# Patient Record
Sex: MALE | Race: WHITE | Employment: FULL TIME | URBAN - METROPOLITAN AREA
[De-identification: names, ages, dates, MRNs, and addresses within clinical notes are randomized per-mention and may not be internally consistent; named-entity substitution may affect disease eponyms.]

---

## 2019-05-25 DIAGNOSIS — F51.01 PRIMARY INSOMNIA: Primary | ICD-10-CM

## 2019-06-03 RX ORDER — TEMAZEPAM 30 MG/1
CAPSULE ORAL
Qty: 30 CAPSULE | Refills: 1 | Status: SHIPPED | OUTPATIENT
Start: 2019-06-03 | End: 2019-07-03

## 2019-10-13 ENCOUNTER — HOSPITAL ENCOUNTER (EMERGENCY)
Age: 44
Discharge: HOME | End: 2019-10-13
Attending: EMERGENCY MEDICINE
Payer: COMMERCIAL

## 2019-10-13 ENCOUNTER — APPOINTMENT (OUTPATIENT)
Dept: GENERAL RADIOLOGY | Age: 44
End: 2019-10-13
Payer: COMMERCIAL

## 2019-10-13 VITALS
TEMPERATURE: 98.2 F | RESPIRATION RATE: 16 BRPM | OXYGEN SATURATION: 97 % | BODY MASS INDEX: 24.79 KG/M2 | DIASTOLIC BLOOD PRESSURE: 88 MMHG | WEIGHT: 210 LBS | SYSTOLIC BLOOD PRESSURE: 140 MMHG | HEIGHT: 77 IN | HEART RATE: 77 BPM

## 2019-10-13 DIAGNOSIS — S93.401A SPRAIN OF RIGHT ANKLE, UNSPECIFIED LIGAMENT, INITIAL ENCOUNTER: Primary | ICD-10-CM

## 2019-10-13 PROCEDURE — 99283 EMERGENCY DEPT VISIT LOW MDM: CPT

## 2019-10-13 PROCEDURE — 73610 X-RAY EXAM OF ANKLE: CPT

## 2019-10-13 ASSESSMENT — PAIN DESCRIPTION - LOCATION: LOCATION: ANKLE

## 2019-10-13 ASSESSMENT — PAIN DESCRIPTION - FREQUENCY: FREQUENCY: CONTINUOUS

## 2019-10-13 ASSESSMENT — PAIN DESCRIPTION - DESCRIPTORS: DESCRIPTORS: SORE

## 2019-10-13 ASSESSMENT — PAIN DESCRIPTION - PAIN TYPE: TYPE: ACUTE PAIN

## 2019-10-13 ASSESSMENT — PAIN DESCRIPTION - ORIENTATION: ORIENTATION: RIGHT

## 2019-10-13 ASSESSMENT — PAIN SCALES - GENERAL: PAINLEVEL_OUTOF10: 6

## 2022-03-29 ENCOUNTER — APPOINTMENT (OUTPATIENT)
Dept: CT IMAGING | Age: 47
End: 2022-03-29
Payer: COMMERCIAL

## 2022-03-29 ENCOUNTER — HOSPITAL ENCOUNTER (EMERGENCY)
Age: 47
Discharge: HOME | End: 2022-03-29
Attending: EMERGENCY MEDICINE
Payer: COMMERCIAL

## 2022-03-29 ENCOUNTER — APPOINTMENT (OUTPATIENT)
Dept: GENERAL RADIOLOGY | Age: 47
End: 2022-03-29
Payer: COMMERCIAL

## 2022-03-29 VITALS
DIASTOLIC BLOOD PRESSURE: 93 MMHG | SYSTOLIC BLOOD PRESSURE: 130 MMHG | BODY MASS INDEX: 25.98 KG/M2 | TEMPERATURE: 98.8 F | HEIGHT: 77 IN | OXYGEN SATURATION: 93 % | HEART RATE: 70 BPM | WEIGHT: 220 LBS | RESPIRATION RATE: 17 BRPM

## 2022-03-29 DIAGNOSIS — R42 VERTIGO: Primary | ICD-10-CM

## 2022-03-29 LAB
ABSOLUTE EOS #: 0.32 K/UL (ref 0–0.44)
ABSOLUTE IMMATURE GRANULOCYTE: <0.03 K/UL (ref 0–0.3)
ABSOLUTE LYMPH #: 1.63 K/UL (ref 1.1–3.7)
ABSOLUTE MONO #: 0.56 K/UL (ref 0.1–1.2)
ALBUMIN SERPL-MCNC: 4.5 G/DL (ref 3.5–5.2)
ALBUMIN/GLOBULIN RATIO: 1.8 (ref 1–2.5)
ALP BLD-CCNC: 73 U/L (ref 40–129)
ALT SERPL-CCNC: 45 U/L (ref 5–41)
ANION GAP SERPL CALCULATED.3IONS-SCNC: 9 MMOL/L (ref 9–17)
AST SERPL-CCNC: 20 U/L
BASOPHILS # BLD: 1 % (ref 0–2)
BASOPHILS ABSOLUTE: 0.06 K/UL (ref 0–0.2)
BILIRUB SERPL-MCNC: 0.41 MG/DL (ref 0.3–1.2)
BUN BLDV-MCNC: 13 MG/DL (ref 6–20)
CALCIUM SERPL-MCNC: 9.6 MG/DL (ref 8.6–10.4)
CHLORIDE BLD-SCNC: 102 MMOL/L (ref 98–107)
CO2: 26 MMOL/L (ref 20–31)
CREAT SERPL-MCNC: 0.96 MG/DL (ref 0.7–1.2)
EOSINOPHILS RELATIVE PERCENT: 4 % (ref 1–4)
GFR AFRICAN AMERICAN: >60 ML/MIN
GFR NON-AFRICAN AMERICAN: >60 ML/MIN
GFR SERPL CREATININE-BSD FRML MDRD: ABNORMAL ML/MIN/{1.73_M2}
GLUCOSE BLD-MCNC: 99 MG/DL (ref 70–99)
HCT VFR BLD CALC: 46.5 % (ref 40.7–50.3)
HEMOGLOBIN: 16.2 G/DL (ref 13–17)
IMMATURE GRANULOCYTES: 0 %
LIPASE: 17 U/L (ref 13–60)
LYMPHOCYTES # BLD: 21 % (ref 24–43)
MAGNESIUM: 2.2 MG/DL (ref 1.6–2.6)
MCH RBC QN AUTO: 29.8 PG (ref 25.2–33.5)
MCHC RBC AUTO-ENTMCNC: 34.8 G/DL (ref 28.4–34.8)
MCV RBC AUTO: 85.5 FL (ref 82.6–102.9)
MONOCYTES # BLD: 7 % (ref 3–12)
NRBC AUTOMATED: 0 PER 100 WBC
PDW BLD-RTO: 13.1 % (ref 11.8–14.4)
PLATELET # BLD: 212 K/UL (ref 138–453)
PMV BLD AUTO: 10.1 FL (ref 8.1–13.5)
POTASSIUM SERPL-SCNC: 3.8 MMOL/L (ref 3.7–5.3)
PRO-BNP: <20 PG/ML
RBC # BLD: 5.44 M/UL (ref 4.21–5.77)
SEG NEUTROPHILS: 67 % (ref 36–65)
SEGMENTED NEUTROPHILS ABSOLUTE COUNT: 5.08 K/UL (ref 1.5–8.1)
SODIUM BLD-SCNC: 137 MMOL/L (ref 135–144)
TOTAL PROTEIN: 7 G/DL (ref 6.4–8.3)
TROPONIN, HIGH SENSITIVITY: <6 NG/L (ref 0–22)
WBC # BLD: 7.7 K/UL (ref 3.5–11.3)

## 2022-03-29 PROCEDURE — 93005 ELECTROCARDIOGRAM TRACING: CPT | Performed by: STUDENT IN AN ORGANIZED HEALTH CARE EDUCATION/TRAINING PROGRAM

## 2022-03-29 PROCEDURE — 6370000000 HC RX 637 (ALT 250 FOR IP): Performed by: STUDENT IN AN ORGANIZED HEALTH CARE EDUCATION/TRAINING PROGRAM

## 2022-03-29 PROCEDURE — 83735 ASSAY OF MAGNESIUM: CPT

## 2022-03-29 PROCEDURE — 71045 X-RAY EXAM CHEST 1 VIEW: CPT

## 2022-03-29 PROCEDURE — 80053 COMPREHEN METABOLIC PANEL: CPT

## 2022-03-29 PROCEDURE — 85025 COMPLETE CBC W/AUTO DIFF WBC: CPT

## 2022-03-29 PROCEDURE — 99285 EMERGENCY DEPT VISIT HI MDM: CPT

## 2022-03-29 PROCEDURE — 83880 ASSAY OF NATRIURETIC PEPTIDE: CPT

## 2022-03-29 PROCEDURE — 83690 ASSAY OF LIPASE: CPT

## 2022-03-29 PROCEDURE — 84484 ASSAY OF TROPONIN QUANT: CPT

## 2022-03-29 PROCEDURE — 70450 CT HEAD/BRAIN W/O DYE: CPT

## 2022-03-29 RX ORDER — MECLIZINE HCL 12.5 MG/1
12.5 TABLET ORAL ONCE
Status: COMPLETED | OUTPATIENT
Start: 2022-03-29 | End: 2022-03-29

## 2022-03-29 RX ORDER — ACETAMINOPHEN 500 MG
1000 TABLET ORAL ONCE
Status: COMPLETED | OUTPATIENT
Start: 2022-03-29 | End: 2022-03-29

## 2022-03-29 RX ADMIN — MECLIZINE HCL 12.5 MG 12.5 MG: 12.5 TABLET ORAL at 20:24

## 2022-03-29 RX ADMIN — ACETAMINOPHEN 1000 MG: 500 TABLET ORAL at 20:24

## 2022-03-29 ASSESSMENT — PAIN DESCRIPTION - LOCATION: LOCATION: HEAD

## 2022-03-29 ASSESSMENT — PAIN SCALES - GENERAL
PAINLEVEL_OUTOF10: 5
PAINLEVEL_OUTOF10: 0
PAINLEVEL_OUTOF10: 2
PAINLEVEL_OUTOF10: 3

## 2022-03-29 ASSESSMENT — PAIN - FUNCTIONAL ASSESSMENT: PAIN_FUNCTIONAL_ASSESSMENT: 0-10

## 2022-03-29 NOTE — ED NOTES
Pt came to ER with wife due to headache and dizziness during the morning. Upon going here as stated pt felt light headedness. Pt had high BP last night as stated. No medication has been taken. Pt felt like his drunk even his not drinking alcohol. BP noted at 554 systolic as stated by wife. Pt had exp acid reflux lately. Pt has a hx of cardiac problem as stated. Breathing non labored. VS taken and recorded. Seen and examined by Dr. Deniz Ho. Pt on semi johansen's position, put pt on full cardiac monitor.      Nena Jimenez RN  03/29/22 8586

## 2022-03-30 LAB
EKG ATRIAL RATE: 71 BPM
EKG P AXIS: 29 DEGREES
EKG P-R INTERVAL: 166 MS
EKG Q-T INTERVAL: 388 MS
EKG QRS DURATION: 110 MS
EKG QTC CALCULATION (BAZETT): 421 MS
EKG R AXIS: -6 DEGREES
EKG T AXIS: 28 DEGREES
EKG VENTRICULAR RATE: 71 BPM

## 2022-03-30 PROCEDURE — 93010 ELECTROCARDIOGRAM REPORT: CPT | Performed by: INTERNAL MEDICINE

## 2022-03-30 ASSESSMENT — ENCOUNTER SYMPTOMS
SHORTNESS OF BREATH: 0
STRIDOR: 0
PHOTOPHOBIA: 0

## 2022-03-30 NOTE — FLOWSHEET NOTE
SPIRITUAL CARE DEPARTMENT - [facility]  PROGRESS NOTE    Shift date: 3.29.2022  Shift day: Tuesday   Shift # 2    Room # 18/18   Name: Vincent Zee            Age: 40 y.o. Gender: male          Evangelical: unknown   Place of Nondenominational: unknown    Referral: Routine Visit    Admit Date & Time: 3/29/2022  7:37 PM    PATIENT/EVENT DESCRIPTION:  Vincent Zee is a 40 y.o. male who has significant other bedside requesting assistance for the patient    SPIRITUAL ASSESSMENT/INTERVENTION:  Patient and significant other appear to be calm and coping. Patient states that he wants to use the bathroom and asked if he could get up. Significant other explains that he has vertigo.  assisted by providing a bedside urinal for the patient. Patient seemed slightly apprehensive but kept the device. No other immediate spiritual or emotional needs present at this time. Determined support to be available. SPIRITUAL CARE FOLLOW-UP PLAN:  Chaplains will remain available to offer spiritual and emotional support as needed. Electronically signed by Radha Sharma on 3/29/2022 at [address] [phone]       03/29/22 2110   Encounter Summary   Services provided to: Patient and family together   Referral/Consult From: Family   Support System Family members   Continue Visiting   ([phone])   Complexity of Encounter Moderate   Length of Encounter 15 minutes   Spiritual Assessment Completed Yes   Routine   Type Initial   Assessment Calm; Approachable;Coping   Intervention Active listening;Explored feelings, thoughts, concerns; Discussed illness/injury and it's impact   Outcome Expressed gratitude;Expressed feelings/needs/concerns;Engaged in conversation     Electronically signed by Citlali Gill on 3/29/2022 at 9:11 PM

## 2022-03-30 NOTE — ED PROVIDER NOTES
[facility]     Emergency Department     Faculty Attestation    I performed a history and physical examination of the patient and discussed management with the resident. I reviewed the residents note and agree with the documented findings and plan of care. Any areas of disagreement are noted on the chart. I was personally present for the key portions of any procedures. I have documented in the chart those procedures where I was not present during the key portions. I have reviewed the emergency nurses triage note. I agree with the chief complaint, past medical history, past surgical history, allergies, medications, social and family history as documented unless otherwise noted below. For Physician Assistant/ Nurse Practitioner cases/documentation I have personally evaluated this patient and have completed at least one if not all key elements of the E/M (history, physical exam, and MDM). Additional findings are as noted. I have personally seen and evaluated the patient. I find the patient's history and physical exam are consistent with the NP/PA documentation. I agree with the care provided, treatment rendered, disposition and follow-up plan. Patient describes an episode yesterday that included headache and dizziness. The dizziness was described both as lightheadedness and certainly a spinning sensation which is felt to have been improved. The patient denies alcohol but felt that he was drunk and still feels out of sorts at the present time. Also concerned of elevated blood pressures that have been occurring lately as well as insomnia. Physical exam I find no neurologic deficits he does indeed have horizontal nystagmus on examination but no motor or sensory deficits are detected. The patient is still considered low to moderate risk for stroke or cardiovascular risk at this time.       EKG Interpretation    Interpreted by emergency department physician    Rhythm: normal

## 2022-03-30 NOTE — ED PROVIDER NOTES
[facility]  ED  Emergency Department Encounter  Emergency Medicine Resident     Pt Name: Sally Padilla  MRN: [id number]  Sidgfkomal 1975  Date of evaluation: 3/29/22  PCP:  Hussein Arias MD    [address] [place] [address]       Chief Complaint   Patient presents with    Dizziness     on and off x2 days    Hypertension     reports bp being high at home last 133/96     Headache       HISTORY OFPRESENT ILLNESS  (Location/Symptom, Timing/Onset, Context/Setting, Quality, Duration, Modifying Factors,Severity.)      Sally Padilla is a 40 y. o.yo male who presents with 2-day history of dizziness, feeling as though the room is spinning. Patient reports yesterday, when he took his blood pressure, it was 162 systolic, his wife in the room reports that his pressures usually 513 systolic. Patient states that his symptoms are progressively worsened which is why he is here. He denies any shortness of breath however complains of intermittent epigastric pain that radiates to his left sternal chest.  He denies any nausea, vomit, abdominal pain. She reports that he is not on any antihypertensive medication, he does not smoke daily, he drinks alcohol occasionally but he does have an extensive family history of cardiac diseases. PAST MEDICAL / SURGICAL / SOCIAL / FAMILY HISTORY      has a past medical history of RLS (restless legs syndrome). has no past surgical history on file.      Social History     Socioeconomic History    Marital status:      Spouse name: Not on file    Number of children: Not on file    Years of education: Not on file    Highest education level: Not on file   Occupational History    Not on file   Tobacco Use    Smoking status: Never Smoker    Smokeless tobacco: Never Used   Substance and Sexual Activity    Alcohol use: No    Drug use: No    Sexual activity: Not on file   Other Topics Concern    Not on file   Social History Narrative    Not on file     Social Determinants of Health     Financial Resource Strain: Low Risk     Difficulty of Paying Living Expenses: Not hard at all   Food Insecurity: No Food Insecurity    Worried About Running Out of Food in the Last Year: Never true    Alfredo of Food in the Last Year: Never true   Transportation Needs: No Transportation Needs    Lack of Transportation (Medical): No    Lack of Transportation (Non-Medical): No   Physical Activity:     Days of Exercise per Week: Not on file    Minutes of Exercise per Session: Not on file   Stress:     Feeling of Stress : Not on file   Social Connections:     Frequency of Communication with Friends and Family: Not on file    Frequency of Social Gatherings with Friends and Family: Not on file    Attends Orthodox Services: Not on file    Active Member of Clubs or Organizations: Not on file    Attends Club or Organization Meetings: Not on file    Marital Status: Not on file   Intimate Partner Violence:     Fear of Current or Ex-Partner: Not on file    Emotionally Abused: Not on file    Physically Abused: Not on file    Sexually Abused: Not on file   Housing Stability:     Unable to Pay for Housing in the Last Year: Not on file    Number of [place] in the Last Year: Not on file    Unstable Housing in the Last Year: Not on file       No family history on file. Allergies:  Codeine    Home Medications:  Prior to Admission medications    Medication Sig Start Date End Date Taking? Authorizing Provider   rOPINIRole (REQUIP) 1 MG tablet TAKE 1 TABLET BY MOUTH EVERY DAY AT NIGHT 1/7/22   Josue St MD   gabapentin (NEURONTIN) 300 MG capsule TAKE 1 CAPSULE BY MOUTH 2 TIMES DAILY FOR 90 DAYS.  11/30/21 2/28/22  Josue St MD   albuterol sulfate HFA (VENTOLIN HFA) 108 (90 Base) MCG/ACT inhaler Inhale 2 puffs into the lungs 4 times daily as needed for Wheezing 10/1/21 10/31/21  Jose Jc MD   hydrOXYzine (ATARAX) 25 MG tablet TAKE 1 TABLET BY MOUTH EVERY DAY AT NIGHT  Patient not taking: Reported on 10/1/2021 5/19/21   Juve DuffyelianCHRISTIANO   temazepam (RESTORIL) 30 MG capsule TAKE 1 CAPSULE BY MOUTH EVERY DAY AT BEDTIME AS NEEDED  Patient not taking: Reported on 10/1/2021 11/25/19   Historical Provider, MD       REVIEW OFSYSTEMS    (2-9 systems for level 4, 10 or more for level 5)      Review of Systems   Constitutional: Negative for fatigue and fever. Eyes: Negative for photophobia and visual disturbance. Respiratory: Negative for shortness of breath and stridor. Cardiovascular: Positive for chest pain (intermittent chest pain). Musculoskeletal: Negative for neck pain and neck stiffness. Skin: Negative for wound. Neurological: Positive for dizziness. Psychiatric/Behavioral: Negative for behavioral problems and confusion. PHYSICAL EXAM   (up to 7 for level 4, 8 or more forlevel 5)      INITIAL VITALS:   ED Triage Vitals   BP Temp Temp Source Pulse Resp SpO2 Height Weight   03/29/22 1935 03/29/22 1949 03/29/22 1949 03/29/22 1935 03/29/22 1935 03/29/22 1935 03/29/22 1937 03/29/22 1937   129/85 98.8 °F (37.1 °C) Oral 80 18 95 % 6' 5\" (1.956 m) 220 lb (99.8 kg)       Physical Exam  Constitutional:       Appearance: Normal appearance. HENT:      Head: Normocephalic and atraumatic. Mouth/Throat:      Mouth: Mucous membranes are moist.   Eyes:      Extraocular Movements: Extraocular movements intact. Pupils: Pupils are equal, round, and reactive to light. Cardiovascular:      Rate and Rhythm: Normal rate. Pulmonary:      Effort: Pulmonary effort is normal. No respiratory distress. Abdominal:      General: There is no distension. Tenderness: There is no abdominal tenderness. Musculoskeletal:         General: No swelling or tenderness. Normal range of motion. Cervical back: No rigidity or tenderness. Skin:     General: Skin is warm. Capillary Refill: Capillary refill takes less than 2 seconds. Coloration: Skin is not jaundiced. Neurological:      General: No focal deficit present. Mental Status: He is alert and oriented to person, place, and time. Motor: No weakness. Gait: Gait normal.      Comments: Horizontal nystagmus appreciated on physical exam, normal finger-to-nose testing, normal gait         DIFFERENTIAL  DIAGNOSIS     PLAN (LABS / IMAGING / EKG):  Orders Placed This Encounter   Procedures    XR CHEST PORTABLE    CT Head WO Contrast    CBC with Auto Differential    Troponin    Brain Natriuretic Peptide    Lipase    Comprehensive Metabolic Panel    Magnesium    EKG 12 Lead       MEDICATIONS ORDERED:  Orders Placed This Encounter   Medications    acetaminophen (TYLENOL) tablet 1,000 mg    meclizine (ANTIVERT) tablet 12.5 mg       Initial MDM/Plan: 40 y.o. male who presents with vertigo, dizziness, intermittent chest pain. Patient does have horizontal nystagmus  Given that vertigo symptoms, dizzy, lightheaded, will get a CT head to rule out any intracranial pathology  We will also get cardiac work-up including EKG to rule out arrhythmias, electrolytes rule out any abnormalities, CBC to rule out anemia  If labs and images are unremarkable, plan for discharge.   Patient does have an appointment already set up with his PCP tomorrow    DIAGNOSTIC RESULTS / EMERGENCYDEPARTMENT COURSE / MDM     LABS:  Labs Reviewed   CBC WITH AUTO DIFFERENTIAL - Abnormal; Notable for the following components:       Result Value    Seg Neutrophils 67 (*)     Lymphocytes 21 (*)     All other components within normal limits   COMPREHENSIVE METABOLIC PANEL - Abnormal; Notable for the following components:    ALT 45 (*)     All other components within normal limits   TROPONIN   BRAIN NATRIURETIC PEPTIDE   LIPASE   MAGNESIUM         RADIOLOGY:  CT Head WO Contrast    Result Date: 3/29/2022  EXAMINATION: CT OF THE HEAD WITHOUT CONTRAST  3/29/2022 10:18 pm TECHNIQUE: CT of the head was performed without the administration of intravenous contrast. Dose modulation, iterative reconstruction, and/or weight based adjustment of the mA/kV was utilized to reduce the radiation dose to as low as reasonably achievable. COMPARISON: 651 HISTORY: ORDERING SYSTEM PROVIDED HISTORY: light headness TECHNOLOGIST PROVIDED HISTORY: light headness Decision Support Exception - unselect if not a suspected or confirmed emergency medical condition->Emergency Medical Condition (MA) FINDINGS: BRAIN/VENTRICLES: There is no acute intracranial hemorrhage, mass effect or midline shift. No abnormal extra-axial fluid collection. The gray-white differentiation is maintained without evidence of an acute infarct. There is no evidence of hydrocephalus. ORBITS: The visualized portion of the orbits demonstrate no acute abnormality. SINUSES: There are mucous retention cysts in the bilateral maxillary sinuses. Mastoid air cells are clear. SOFT TISSUES/SKULL:  No acute abnormality of the visualized skull or soft tissues. No acute intracranial abnormality. XR CHEST PORTABLE    Result Date: 3/29/2022  EXAMINATION: ONE XRAY VIEW OF THE CHEST 3/29/2022 8:11 pm COMPARISON: None. HISTORY: ORDERING SYSTEM PROVIDED HISTORY: chest pain TECHNOLOGIST PROVIDED HISTORY: chest pain FINDINGS: Overlying ECG monitor leads. The lungs are without acute focal process. There may be slight atelectasis left lateral base. There is no effusion or pneumothorax. The cardiomediastinal silhouette is without acute process. The osseous structures are without acute process. No acute process.          EKG  EKG Interpretation    Interpreted by me    Rhythm: normal sinus   Rate: normal  Axis: normal  Ectopy: none  Conduction: normal  ST Segments: no acute change  T Waves: no acute change  Q Waves: none    Clinical Impression: no acute changes and normal EKG    All EKG's are interpreted by the Emergency Department Physicianwho either signs or Co-signs this chart in the absence of a cardiologist.    EMERGENCY DEPARTMENT COURSE:  ED Course as of 03/30/22 0022   Wed Mar 30, 2022   0021 Patient CT head was negative. Electrolytes are within normal limits. Cardiac work-up normal.  I did assess him after he received Tylenol and Antivert, patient reports that his symptoms were improved with Antivert. Plan for discharge with close ECP follow-up tomorrow. Patient given strict return precautions return to emergency room if he has worsening symptoms. Patient agrees . [AN]      ED Course User Index  [AN] Sofía Calloway MD          PROCEDURES:  None    CONSULTS:  None    CRITICAL CARE:      FINAL IMPRESSION      1.  Vertigo          DISPOSITION / PLAN     DISPOSITION Decision To Discharge 03/29/2022 10:33:16 PM      PATIENT REFERRED TO:  [facility]  [address] [facility] [address]  [phone]    If symptoms worsen    Jill Phelps MD  [address] [phone]      As needed      DISCHARGE MEDICATIONS:  Discharge Medication List as of 3/29/2022 10:37 PM          Sofía Calloway MD  Emergency Medicine Resident    (Please note that portions of this note were completed with a voice recognition program.Efforts were made to edit the dictations but occasionally words are mis-transcribed.)        Sofía Calloway MD  Resident  03/30/22 0065

## 2023-04-24 ENCOUNTER — HOSPITAL ENCOUNTER (OUTPATIENT)
Dept: NUCLEAR MEDICINE | Age: 48
Discharge: HOME | End: 2023-04-26
Payer: COMMERCIAL

## 2023-04-24 DIAGNOSIS — R10.11 RIGHT UPPER QUADRANT ABDOMINAL PAIN: ICD-10-CM

## 2023-04-24 PROCEDURE — 3430000000 HC RX DIAGNOSTIC RADIOPHARMACEUTICAL: Performed by: FAMILY MEDICINE

## 2023-04-24 PROCEDURE — A9537 TC99M MEBROFENIN: HCPCS | Performed by: FAMILY MEDICINE

## 2023-04-24 PROCEDURE — 78227 HEPATOBIL SYST IMAGE W/DRUG: CPT

## 2023-04-24 RX ADMIN — Medication 6 MILLICURIE: at 08:18

## 2023-05-09 ENCOUNTER — HOSPITAL ENCOUNTER (OUTPATIENT)
Dept: VASCULAR LAB | Age: 48
Discharge: HOME | End: 2023-05-09
Payer: COMMERCIAL

## 2023-05-09 DIAGNOSIS — R09.89 ABNORMAL CAROTID PULSE: ICD-10-CM

## 2023-05-09 DIAGNOSIS — R42 DIZZINESS: ICD-10-CM

## 2023-05-09 PROCEDURE — 93880 EXTRACRANIAL BILAT STUDY: CPT

## 2024-03-01 PROBLEM — E66.3 OVERWEIGHT WITH BODY MASS INDEX (BMI) OF 28 TO 28.9 IN ADULT: Status: ACTIVE | Noted: 2024-03-01

## 2024-03-01 PROBLEM — I10 HTN (HYPERTENSION), BENIGN: Status: ACTIVE | Noted: 2024-03-01

## 2024-05-21 PROBLEM — J35.3 ENLARGED TONSILS AND ADENOIDS: Status: ACTIVE | Noted: 2024-05-21

## 2024-05-21 PROBLEM — J30.2 SEASONAL ALLERGIC REACTION: Status: ACTIVE | Noted: 2024-05-21

## 2024-08-27 PROBLEM — R11.0 NAUSEA: Status: ACTIVE | Noted: 2024-01-12

## 2024-08-27 PROBLEM — R10.11 RIGHT UPPER QUADRANT PAIN: Status: ACTIVE | Noted: 2024-01-12

## 2024-09-06 DIAGNOSIS — I10 HTN (HYPERTENSION), BENIGN: ICD-10-CM

## 2024-09-06 RX ORDER — LISINOPRIL 5 MG/1
TABLET ORAL
Qty: 135 TABLET | Refills: 0 | Status: CANCELLED | OUTPATIENT
Start: 2024-09-06

## 2025-07-08 ENCOUNTER — HOSPITAL ENCOUNTER
Dept: HOSPITAL 101 - SLEEP | Age: 50
Discharge: HOME | End: 2025-07-08
Payer: COMMERCIAL

## 2025-07-08 DIAGNOSIS — G25.81: ICD-10-CM

## 2025-07-08 DIAGNOSIS — G47.33: Primary | ICD-10-CM

## 2025-07-08 DIAGNOSIS — F51.01: ICD-10-CM

## 2025-07-08 PROCEDURE — 95810 POLYSOM 6/> YRS 4/> PARAM: CPT

## 2025-07-28 ENCOUNTER — APPOINTMENT (OUTPATIENT)
Dept: CT IMAGING | Age: 50
DRG: 390 | End: 2025-07-28
Payer: COMMERCIAL

## 2025-07-28 ENCOUNTER — APPOINTMENT (OUTPATIENT)
Dept: GENERAL RADIOLOGY | Age: 50
DRG: 390 | End: 2025-07-28
Payer: COMMERCIAL

## 2025-07-28 ENCOUNTER — HOSPITAL ENCOUNTER (INPATIENT)
Age: 50
LOS: 2 days | Discharge: HOME | DRG: 390 | End: 2025-07-30
Attending: STUDENT IN AN ORGANIZED HEALTH CARE EDUCATION/TRAINING PROGRAM | Admitting: STUDENT IN AN ORGANIZED HEALTH CARE EDUCATION/TRAINING PROGRAM
Payer: COMMERCIAL

## 2025-07-28 DIAGNOSIS — K56.609 SMALL BOWEL OBSTRUCTION (HCC): Primary | ICD-10-CM

## 2025-07-28 LAB
ALBUMIN SERPL-MCNC: 5 G/DL (ref 3.5–5.2)
ALBUMIN/GLOB SERPL: 1.4 {RATIO} (ref 1–2.5)
ALP SERPL-CCNC: 95 U/L (ref 40–129)
ALT SERPL-CCNC: 48 U/L (ref 10–50)
ANION GAP SERPL CALCULATED.3IONS-SCNC: 13 MMOL/L (ref 9–16)
AST SERPL-CCNC: 25 U/L (ref 10–50)
BASOPHILS # BLD: 0 K/UL (ref 0–0.2)
BASOPHILS # BLD: 0 K/UL (ref 0–0.2)
BASOPHILS NFR BLD: 0 % (ref 0–2)
BASOPHILS NFR BLD: 0 % (ref 0–2)
BILIRUB SERPL-MCNC: 0.9 MG/DL (ref 0–1.2)
BUN SERPL-MCNC: 20 MG/DL (ref 6–20)
CALCIUM SERPL-MCNC: 10.4 MG/DL (ref 8.6–10.4)
CHLORIDE SERPL-SCNC: 101 MMOL/L (ref 98–107)
CO2 SERPL-SCNC: 22 MMOL/L (ref 20–31)
CREAT SERPL-MCNC: 1.1 MG/DL (ref 0.7–1.2)
EKG ATRIAL RATE: 88 BPM
EKG P AXIS: 44 DEGREES
EKG P-R INTERVAL: 156 MS
EKG Q-T INTERVAL: 354 MS
EKG QRS DURATION: 98 MS
EKG QTC CALCULATION (BAZETT): 428 MS
EKG R AXIS: -16 DEGREES
EKG T AXIS: 41 DEGREES
EKG VENTRICULAR RATE: 88 BPM
EOSINOPHIL # BLD: 0.1 K/UL (ref 0–0.4)
EOSINOPHIL # BLD: 0.1 K/UL (ref 0–0.4)
EOSINOPHILS RELATIVE PERCENT: 1 % (ref 1–4)
EOSINOPHILS RELATIVE PERCENT: 2 % (ref 1–4)
ERYTHROCYTE [DISTWIDTH] IN BLOOD BY AUTOMATED COUNT: 14.4 % (ref 12.5–15.4)
ERYTHROCYTE [DISTWIDTH] IN BLOOD BY AUTOMATED COUNT: 14.6 % (ref 12.5–15.4)
GFR, ESTIMATED: 83 ML/MIN/1.73M2
GLUCOSE SERPL-MCNC: 108 MG/DL (ref 74–99)
HCT VFR BLD AUTO: 48.3 % (ref 41–53)
HCT VFR BLD AUTO: 55.7 % (ref 41–53)
HGB BLD-MCNC: 16.4 G/DL (ref 13.5–17.5)
HGB BLD-MCNC: 18.9 G/DL (ref 13.5–17.5)
LACTATE BLDV-SCNC: 1.6 MMOL/L (ref 0.5–2.2)
LYMPHOCYTES NFR BLD: 0.7 K/UL (ref 1–4.8)
LYMPHOCYTES NFR BLD: 1.4 K/UL (ref 1–4.8)
LYMPHOCYTES RELATIVE PERCENT: 18 % (ref 24–44)
LYMPHOCYTES RELATIVE PERCENT: 6 % (ref 24–44)
MAGNESIUM SERPL-MCNC: 2 MG/DL (ref 1.6–2.6)
MCH RBC QN AUTO: 29.3 PG (ref 26–34)
MCH RBC QN AUTO: 29.5 PG (ref 26–34)
MCHC RBC AUTO-ENTMCNC: 33.9 G/DL (ref 31–37)
MCHC RBC AUTO-ENTMCNC: 33.9 G/DL (ref 31–37)
MCV RBC AUTO: 86.4 FL (ref 80–100)
MCV RBC AUTO: 87.1 FL (ref 80–100)
MONOCYTES NFR BLD: 0.6 K/UL (ref 0.1–1.2)
MONOCYTES NFR BLD: 0.7 K/UL (ref 0.1–1.2)
MONOCYTES NFR BLD: 6 % (ref 2–11)
MONOCYTES NFR BLD: 8 % (ref 2–11)
NEUTROPHILS NFR BLD: 72 % (ref 36–66)
NEUTROPHILS NFR BLD: 87 % (ref 36–66)
NEUTS SEG NFR BLD: 10.6 K/UL (ref 1.8–7.7)
NEUTS SEG NFR BLD: 5.7 K/UL (ref 1.8–7.7)
PLATELET # BLD AUTO: 203 K/UL (ref 140–450)
PLATELET # BLD AUTO: 231 K/UL (ref 140–450)
PMV BLD AUTO: 8.3 FL (ref 6–12)
PMV BLD AUTO: 8.3 FL (ref 6–12)
POTASSIUM SERPL-SCNC: 4.5 MMOL/L (ref 3.7–5.3)
PROT SERPL-MCNC: 8.5 G/DL (ref 6.6–8.7)
RBC # BLD AUTO: 5.55 M/UL (ref 4.5–5.9)
RBC # BLD AUTO: 6.44 M/UL (ref 4.5–5.9)
SODIUM SERPL-SCNC: 136 MMOL/L (ref 136–145)
TROPONIN I SERPL HS-MCNC: <6 NG/L (ref 0–22)
WBC OTHER # BLD: 12.1 K/UL (ref 3.5–11)
WBC OTHER # BLD: 7.8 K/UL (ref 3.5–11)

## 2025-07-28 PROCEDURE — 2580000003 HC RX 258: Performed by: STUDENT IN AN ORGANIZED HEALTH CARE EDUCATION/TRAINING PROGRAM

## 2025-07-28 PROCEDURE — 74018 RADEX ABDOMEN 1 VIEW: CPT

## 2025-07-28 PROCEDURE — 6360000004 HC RX CONTRAST MEDICATION

## 2025-07-28 PROCEDURE — 74177 CT ABD & PELVIS W/CONTRAST: CPT

## 2025-07-28 PROCEDURE — 96374 THER/PROPH/DIAG INJ IV PUSH: CPT

## 2025-07-28 PROCEDURE — 2580000003 HC RX 258

## 2025-07-28 PROCEDURE — 84484 ASSAY OF TROPONIN QUANT: CPT

## 2025-07-28 PROCEDURE — 99222 1ST HOSP IP/OBS MODERATE 55: CPT | Performed by: STUDENT IN AN ORGANIZED HEALTH CARE EDUCATION/TRAINING PROGRAM

## 2025-07-28 PROCEDURE — 6360000002 HC RX W HCPCS

## 2025-07-28 PROCEDURE — 36415 COLL VENOUS BLD VENIPUNCTURE: CPT

## 2025-07-28 PROCEDURE — 96376 TX/PRO/DX INJ SAME DRUG ADON: CPT

## 2025-07-28 PROCEDURE — 83605 ASSAY OF LACTIC ACID: CPT

## 2025-07-28 PROCEDURE — 2500000003 HC RX 250 WO HCPCS

## 2025-07-28 PROCEDURE — 1200000000 HC SEMI PRIVATE

## 2025-07-28 PROCEDURE — 93010 ELECTROCARDIOGRAM REPORT: CPT | Performed by: INTERNAL MEDICINE

## 2025-07-28 PROCEDURE — 83735 ASSAY OF MAGNESIUM: CPT

## 2025-07-28 PROCEDURE — 80053 COMPREHEN METABOLIC PANEL: CPT

## 2025-07-28 PROCEDURE — 99285 EMERGENCY DEPT VISIT HI MDM: CPT

## 2025-07-28 PROCEDURE — 85025 COMPLETE CBC W/AUTO DIFF WBC: CPT

## 2025-07-28 PROCEDURE — 6360000002 HC RX W HCPCS: Performed by: STUDENT IN AN ORGANIZED HEALTH CARE EDUCATION/TRAINING PROGRAM

## 2025-07-28 PROCEDURE — 93005 ELECTROCARDIOGRAM TRACING: CPT

## 2025-07-28 PROCEDURE — 96375 TX/PRO/DX INJ NEW DRUG ADDON: CPT

## 2025-07-28 RX ORDER — MAGNESIUM SULFATE IN WATER 40 MG/ML
2000 INJECTION, SOLUTION INTRAVENOUS PRN
Status: DISCONTINUED | OUTPATIENT
Start: 2025-07-28 | End: 2025-07-30 | Stop reason: HOSPADM

## 2025-07-28 RX ORDER — LORAZEPAM 2 MG/ML
1 INJECTION INTRAMUSCULAR ONCE
Status: COMPLETED | OUTPATIENT
Start: 2025-07-28 | End: 2025-07-28

## 2025-07-28 RX ORDER — 0.9 % SODIUM CHLORIDE 0.9 %
80 INTRAVENOUS SOLUTION INTRAVENOUS ONCE
Status: DISCONTINUED | OUTPATIENT
Start: 2025-07-28 | End: 2025-07-30 | Stop reason: HOSPADM

## 2025-07-28 RX ORDER — POTASSIUM CHLORIDE 7.45 MG/ML
10 INJECTION INTRAVENOUS PRN
Status: DISCONTINUED | OUTPATIENT
Start: 2025-07-28 | End: 2025-07-30 | Stop reason: HOSPADM

## 2025-07-28 RX ORDER — SODIUM CHLORIDE, SODIUM LACTATE, POTASSIUM CHLORIDE, AND CALCIUM CHLORIDE .6; .31; .03; .02 G/100ML; G/100ML; G/100ML; G/100ML
1000 INJECTION, SOLUTION INTRAVENOUS ONCE
Status: COMPLETED | OUTPATIENT
Start: 2025-07-28 | End: 2025-07-28

## 2025-07-28 RX ORDER — ONDANSETRON 4 MG/1
4 TABLET, ORALLY DISINTEGRATING ORAL EVERY 8 HOURS PRN
Status: DISCONTINUED | OUTPATIENT
Start: 2025-07-28 | End: 2025-07-30 | Stop reason: HOSPADM

## 2025-07-28 RX ORDER — SODIUM CHLORIDE 9 MG/ML
INJECTION, SOLUTION INTRAVENOUS CONTINUOUS
Status: ACTIVE | OUTPATIENT
Start: 2025-07-28 | End: 2025-07-29

## 2025-07-28 RX ORDER — SODIUM CHLORIDE 0.9 % (FLUSH) 0.9 %
5-40 SYRINGE (ML) INJECTION PRN
Status: DISCONTINUED | OUTPATIENT
Start: 2025-07-28 | End: 2025-07-30 | Stop reason: HOSPADM

## 2025-07-28 RX ORDER — ONDANSETRON 2 MG/ML
4 INJECTION INTRAMUSCULAR; INTRAVENOUS ONCE
Status: COMPLETED | OUTPATIENT
Start: 2025-07-28 | End: 2025-07-28

## 2025-07-28 RX ORDER — PANTOPRAZOLE SODIUM 40 MG/1
40 TABLET, DELAYED RELEASE ORAL
Status: DISCONTINUED | OUTPATIENT
Start: 2025-07-29 | End: 2025-07-30 | Stop reason: HOSPADM

## 2025-07-28 RX ORDER — MORPHINE SULFATE 2 MG/ML
2 INJECTION, SOLUTION INTRAMUSCULAR; INTRAVENOUS EVERY 4 HOURS PRN
Refills: 0 | Status: DISCONTINUED | OUTPATIENT
Start: 2025-07-28 | End: 2025-07-30 | Stop reason: HOSPADM

## 2025-07-28 RX ORDER — POLYETHYLENE GLYCOL 3350 17 G/17G
17 POWDER, FOR SOLUTION ORAL DAILY PRN
Status: DISCONTINUED | OUTPATIENT
Start: 2025-07-28 | End: 2025-07-30 | Stop reason: HOSPADM

## 2025-07-28 RX ORDER — ROPINIROLE HYDROCHLORIDE 4 MG/1
4 TABLET, FILM COATED, EXTENDED RELEASE ORAL NIGHTLY
Status: DISCONTINUED | OUTPATIENT
Start: 2025-07-28 | End: 2025-07-30 | Stop reason: HOSPADM

## 2025-07-28 RX ORDER — SODIUM CHLORIDE 0.9 % (FLUSH) 0.9 %
10 SYRINGE (ML) INJECTION ONCE
Status: COMPLETED | OUTPATIENT
Start: 2025-07-28 | End: 2025-07-28

## 2025-07-28 RX ORDER — IOPAMIDOL 755 MG/ML
100 INJECTION, SOLUTION INTRAVASCULAR
Status: COMPLETED | OUTPATIENT
Start: 2025-07-28 | End: 2025-07-28

## 2025-07-28 RX ORDER — SODIUM CHLORIDE 0.9 % (FLUSH) 0.9 %
5-40 SYRINGE (ML) INJECTION EVERY 12 HOURS SCHEDULED
Status: DISCONTINUED | OUTPATIENT
Start: 2025-07-28 | End: 2025-07-30 | Stop reason: HOSPADM

## 2025-07-28 RX ORDER — MULTIVITAMIN WITH IRON
1 TABLET ORAL DAILY
COMMUNITY

## 2025-07-28 RX ORDER — FENTANYL CITRATE 50 UG/ML
50 INJECTION, SOLUTION INTRAMUSCULAR; INTRAVENOUS ONCE
Status: COMPLETED | OUTPATIENT
Start: 2025-07-28 | End: 2025-07-28

## 2025-07-28 RX ORDER — FLUTICASONE PROPIONATE 50 MCG
2 SPRAY, SUSPENSION (ML) NASAL DAILY PRN
Status: DISCONTINUED | OUTPATIENT
Start: 2025-07-28 | End: 2025-07-30 | Stop reason: HOSPADM

## 2025-07-28 RX ORDER — LORAZEPAM 2 MG/ML
0.5 INJECTION INTRAMUSCULAR ONCE
Status: DISCONTINUED | OUTPATIENT
Start: 2025-07-28 | End: 2025-07-30 | Stop reason: HOSPADM

## 2025-07-28 RX ORDER — GABAPENTIN 600 MG/1
600 TABLET ORAL 2 TIMES DAILY
Status: DISCONTINUED | OUTPATIENT
Start: 2025-07-28 | End: 2025-07-30 | Stop reason: HOSPADM

## 2025-07-28 RX ORDER — ENOXAPARIN SODIUM 100 MG/ML
30 INJECTION SUBCUTANEOUS 2 TIMES DAILY
Status: DISCONTINUED | OUTPATIENT
Start: 2025-07-28 | End: 2025-07-30 | Stop reason: HOSPADM

## 2025-07-28 RX ORDER — ACETAMINOPHEN 325 MG/1
650 TABLET ORAL EVERY 6 HOURS PRN
Status: DISCONTINUED | OUTPATIENT
Start: 2025-07-28 | End: 2025-07-30 | Stop reason: HOSPADM

## 2025-07-28 RX ORDER — FENTANYL CITRATE 50 UG/ML
25 INJECTION, SOLUTION INTRAMUSCULAR; INTRAVENOUS ONCE
Refills: 0 | Status: COMPLETED | OUTPATIENT
Start: 2025-07-28 | End: 2025-07-28

## 2025-07-28 RX ORDER — CETIRIZINE HYDROCHLORIDE 10 MG/1
5 TABLET ORAL DAILY
Status: DISCONTINUED | OUTPATIENT
Start: 2025-07-28 | End: 2025-07-30 | Stop reason: HOSPADM

## 2025-07-28 RX ORDER — SODIUM CHLORIDE 9 MG/ML
INJECTION, SOLUTION INTRAVENOUS PRN
Status: DISCONTINUED | OUTPATIENT
Start: 2025-07-28 | End: 2025-07-30 | Stop reason: HOSPADM

## 2025-07-28 RX ORDER — ACETAMINOPHEN 650 MG/1
650 SUPPOSITORY RECTAL EVERY 6 HOURS PRN
Status: DISCONTINUED | OUTPATIENT
Start: 2025-07-28 | End: 2025-07-30 | Stop reason: HOSPADM

## 2025-07-28 RX ORDER — ONDANSETRON 2 MG/ML
4 INJECTION INTRAMUSCULAR; INTRAVENOUS EVERY 6 HOURS PRN
Status: DISCONTINUED | OUTPATIENT
Start: 2025-07-28 | End: 2025-07-30 | Stop reason: HOSPADM

## 2025-07-28 RX ORDER — POTASSIUM CHLORIDE 1500 MG/1
40 TABLET, EXTENDED RELEASE ORAL PRN
Status: DISCONTINUED | OUTPATIENT
Start: 2025-07-28 | End: 2025-07-30 | Stop reason: HOSPADM

## 2025-07-28 RX ADMIN — FENTANYL CITRATE 50 MCG: 50 INJECTION INTRAMUSCULAR; INTRAVENOUS at 14:56

## 2025-07-28 RX ADMIN — IOPAMIDOL 100 ML: 755 INJECTION, SOLUTION INTRAVENOUS at 15:27

## 2025-07-28 RX ADMIN — SODIUM CHLORIDE: 0.9 INJECTION, SOLUTION INTRAVENOUS at 20:49

## 2025-07-28 RX ADMIN — FENTANYL CITRATE 25 MCG: 50 INJECTION INTRAMUSCULAR; INTRAVENOUS at 16:47

## 2025-07-28 RX ADMIN — SODIUM CHLORIDE: 0.9 INJECTION, SOLUTION INTRAVENOUS at 18:47

## 2025-07-28 RX ADMIN — Medication 80 ML: at 15:27

## 2025-07-28 RX ADMIN — ENOXAPARIN SODIUM 30 MG: 100 INJECTION SUBCUTANEOUS at 22:46

## 2025-07-28 RX ADMIN — ONDANSETRON 4 MG: 2 INJECTION, SOLUTION INTRAMUSCULAR; INTRAVENOUS at 14:55

## 2025-07-28 RX ADMIN — Medication 1 MG: at 18:22

## 2025-07-28 RX ADMIN — SODIUM CHLORIDE, PRESERVATIVE FREE 10 ML: 5 INJECTION INTRAVENOUS at 15:27

## 2025-07-28 RX ADMIN — SODIUM CHLORIDE, SODIUM LACTATE, POTASSIUM CHLORIDE, AND CALCIUM CHLORIDE 1000 ML: .6; .31; .03; .02 INJECTION, SOLUTION INTRAVENOUS at 14:55

## 2025-07-28 ASSESSMENT — PAIN - FUNCTIONAL ASSESSMENT: PAIN_FUNCTIONAL_ASSESSMENT: 0-10

## 2025-07-28 ASSESSMENT — PAIN DESCRIPTION - LOCATION
LOCATION: ABDOMEN
LOCATION: ABDOMEN

## 2025-07-28 ASSESSMENT — PAIN SCALES - GENERAL
PAINLEVEL_OUTOF10: 8
PAINLEVEL_OUTOF10: 5
PAINLEVEL_OUTOF10: 4
PAINLEVEL_OUTOF10: 7

## 2025-07-28 ASSESSMENT — PAIN DESCRIPTION - ORIENTATION: ORIENTATION: ANTERIOR

## 2025-07-28 NOTE — ED PROVIDER NOTES
[facility] Emergency Department  [address]  [facility] [address]  Phone: [phone]  Fax: [phone]      Patient Name:  Jeremy Maldonado  Medical Record Number:  [id number]  YOB: 1975  Date of Service:  7/28/2025  Primary Care Physician:  Cornelius Lorenzana MD      CHIEF COMPLAINT:       Chief Complaint   Patient presents with    Vomiting    Abdominal Pain     Pt reports diffuse abdominal pain started last night, worsening today, radiating up to left side of chest and shoulder. Hurts to take a deep breath. Pt reports he started vomiting today.        HISTORY OF PRESENT ILLNESS:    Jeremy Maldonado is a 49 y.o. male who presents with the complaint of diffuse abdominal pain beginning last evening worsening upon awakening this morning associated with 2 episodes of large volume emesis, left sided chest pain radiating into the left shoulder blade with deep inspiration.    Patient has a history of cholecystectomy in April 2024 otherwise denies any abdominal surgeries.  He denies any injuries to the abdomen, denies any constipation, dysuria, hematuria, hematemesis.  Patient reports he was able to have a bowel movement this morning that seem to be normal.    Upon arrival his abdomen is slightly firm, distended, has generalized tenderness.  Lung sounds clear and equal bilaterally.  Will obtain CT imaging of the chest abdomen pelvis, metabolic studies and treat his pain/nausea.    CURRENT MEDICATIONS:      Previous Medications    FLUTICASONE (FLONASE) 50 MCG/ACT NASAL SPRAY    2 sprays by Each Nostril route daily    GABAPENTIN (NEURONTIN) 600 MG TABLET    TAKE 1 TABLET BY MOUTH 2 TIMES A DAY FOR 90 DAYS    LEVOCETIRIZINE (XYZAL) 5 MG TABLET    Take 1 tablet by mouth nightly    LISINOPRIL (PRINIVIL;ZESTRIL) 5 MG TABLET    take 1 AND 1/2 tablets by mouth once daily    MULTIPLE VITAMIN (MULTIVITAMIN) TABS TABLET    Take 1 tablet by mouth daily    OMEPRAZOLE (PRILOSEC) 20 MG DELAYED RELEASE

## 2025-07-28 NOTE — CONSULTS
palpitations and CP  Resp: Denies SOB and cough  GI: Abdominal discomfort, nausea  : Denies dysuria and urinary frequency  MSK: Denies myalgia and joint pain  Neuro: Denies syncope and weakness.  Skin: Denies rash and pruritus  Psych: Denies recent changes in mood. Denies anxiety and depression    PHYSICAL EXAM:    VITALS  Temp  Av °F (37.2 °C)  Min: 99 °F (37.2 °C)  Max: 99 °F (37.2 °C)  Systolic (24hrs), Av , Min:117 , Max:131   Diastolic (24hrs), Av, Min:88, Max:88  Pulse  Av.5  Min: 84  Max: 91  Resp  Av  Min: 18  Max: 18  SpO2: 94 % SpO2  Av.3 %  Min: 94 %  Max: 99 %     General: No acute distress. A&Ox3.  Head: NC/AT.  EENT: Conjunctiva moist without icterus. Ears are symmetric.  Normal auditory acuity. Nares are patent. No rhinorrhea.  Mouth/Throat: Oral mucus membranes are moist.  Neck:  Supple. No LAD.  Cardiovascular:  Regular rate and rhythm. Warm, well perfused.  Respiratory:  Equal chest rise bilaterally. No wheezes, stridor, or increased work of breathing.  Abdomen: Tender to palpate at the left side of abdomen.  Abdomen is soft, nondistended, no rebound or guarding.  Neuro:  Motor and sensory grossly intact.  Extremities:  Warm, dry, and well perfused.  Limbs without apparent deformity.  Skin:  No rashes or lesions.  Psych: Normal mood and appropriate affect    LABS:  Recent Labs     25  1420   WBC 12.1*   HGB 18.9*        Recent Labs     25  1420      K 4.5      CO2 22   BUN 20   CREATININE 1.1   GLUCOSE 108*     Recent Labs     25  1420   AST 25   ALT 48   ALKPHOS 95   BILITOT 0.9     No results for input(s): \"APTT\", \"INR\" in the last 72 hours.    Invalid input(s): \"PROT\"    IMAGING:  CT CHEST ABDOMEN PELVIS W CONTRAST Additional Contrast? None  Result Date: 2025  1. Small bowel obstruction with possible transition point in the left lower quadrant of the abdomen. No obvious mass lesions are seen. 2. No acute cardiopulmonary

## 2025-07-28 NOTE — H&P
EKG 12 Lead (Chest Pain)    Collection Time: 07/28/25  1:52 PM   Result Value Ref Range    Ventricular Rate 88 BPM    Atrial Rate 88 BPM    P-R Interval 156 ms    QRS Duration 98 ms    Q-T Interval 354 ms    QTc Calculation (Bazett) 428 ms    P Axis 44 degrees    R Axis -16 degrees    T Axis 41 degrees   CBC with Auto Differential    Collection Time: 07/28/25  2:20 PM   Result Value Ref Range    WBC 12.1 (H) 3.5 - 11.0 k/uL    RBC 6.44 (H) 4.5 - 5.9 m/uL    Hemoglobin 18.9 (H) 13.5 - 17.5 g/dL    Hematocrit 55.7 (H) 41 - 53 %    MCV 86.4 80 - 100 fL    MCH 29.3 26 - 34 pg    MCHC 33.9 31 - 37 g/dL    RDW 14.6 12.5 - 15.4 %    Platelets 231 140 - 450 k/uL    MPV 8.3 6.0 - 12.0 fL    Neutrophils % 87 (H) 36 - 66 %    Lymphocytes % 6 (L) 24 - 44 %    Monocytes % 6 2 - 11 %    Eosinophils % 1 1 - 4 %    Basophils % 0 0 - 2 %    Neutrophils Absolute 10.60 (H) 1.8 - 7.7 k/uL    Lymphocytes Absolute 0.70 (L) 1.0 - 4.8 k/uL    Monocytes Absolute 0.70 0.1 - 1.2 k/uL    Eosinophils Absolute 0.10 0.0 - 0.4 k/uL    Basophils Absolute 0.00 0.0 - 0.2 k/uL   CMP    Collection Time: 07/28/25  2:20 PM   Result Value Ref Range    Sodium 136 136 - 145 mmol/L    Potassium 4.5 3.7 - 5.3 mmol/L    Chloride 101 98 - 107 mmol/L    CO2 22 20 - 31 mmol/L    Anion Gap 13 9 - 16 mmol/L    Glucose 108 (H) 74 - 99 mg/dL    BUN 20 6 - 20 mg/dL    Creatinine 1.1 0.7 - 1.2 mg/dL    Est, Glom Filt Rate 83 >60 mL/min/1.73m2    Calcium 10.4 8.6 - 10.4 mg/dL    Total Protein 8.5 6.6 - 8.7 g/dL    Albumin 5.0 3.5 - 5.2 g/dL    Albumin/Globulin Ratio 1.4 1.0 - 2.5    Total Bilirubin 0.9 0.0 - 1.2 mg/dL    Alkaline Phosphatase 95 40 - 129 U/L    ALT 48 10 - 50 U/L    AST 25 10 - 50 U/L   Lactic Acid    Collection Time: 07/28/25  2:20 PM   Result Value Ref Range    Lactic Acid 1.6 0.5 - 2.2 mmol/L   Magnesium    Collection Time: 07/28/25  2:20 PM   Result Value Ref Range    Magnesium 2.0 1.6 - 2.6 mg/dL   Troponin    Collection Time: 07/28/25  2:20 PM

## 2025-07-28 NOTE — ED NOTES
ED to inpatient nurses report      Chief Complaint:  Chief Complaint   Patient presents with    Vomiting    Abdominal Pain     Pt reports diffuse abdominal pain started last night, worsening today, radiating up to left side of chest and shoulder. Hurts to take a deep breath. Pt reports he started vomiting today.      Present to ED from: home     MOA:     LOC: alert and orientated to name, place, date  Mobility: Independent  Oxygen Baseline: 94% room air    Current needs required: room air    Pending ED orders: admit to inpatient, Consult general surgery  Present condition: pt is stable     Why did the patient come to the ED? Vomiting and abdominal pain     What is the plan?   Small bowel obstruction-NG tube.  General surgery consulted.  Antiemetics and pain control as needed.  Monitor labs daily.  Serial abdominal exams.  Anxiety/depression-continue home meds  GERD-continue PPI  RLS-continue Requip  Essential hypertension-continue lisinopril  Seasonal allergies-continue Flonase and Xyzal.    Any procedures or intervention occur? CT Chest Abdomen Pelvis W Contrast     Safety concerns? none    CODE STATUS Full Code    Diet Diet NPO    Mental Status:  Level of Consciousness: Alert (0)    Psych Assessment:   Psychosocial  Psychosocial (WDL): Exceptions to WDL  Patient Behaviors: Anxious  Vital signs   Vitals:    07/28/25 1338 07/28/25 1456 07/28/25 1647   BP: 131/88 117/88    Pulse: 91 84    Resp: 18 18    Temp: 99 °F (37.2 °C)     TempSrc: Oral     SpO2: 99% 99% 94%   Weight: 111.1 kg (245 lb)     Height: 1.956 m (6' 5\")          Vitals:  Patient Vitals for the past 24 hrs:   BP Temp Temp src Pulse Resp SpO2 Height Weight   07/28/25 1647 -- -- -- -- -- 94 % -- --   07/28/25 1456 117/88 -- -- 84 18 99 % -- --   07/28/25 1338 131/88 99 °F (37.2 °C) Oral 91 18 99 % 1.956 m (6' 5\") 111.1 kg (245 lb)      Visit Vitals  /88   Pulse 84   Temp 99 °F (37.2 °C) (Oral)   Resp 18   Ht 1.956 m (6' 5\")   Wt 111.1 kg (245 lb)

## 2025-07-29 ENCOUNTER — APPOINTMENT (OUTPATIENT)
Dept: GENERAL RADIOLOGY | Age: 50
DRG: 390 | End: 2025-07-29
Payer: COMMERCIAL

## 2025-07-29 LAB
ALBUMIN SERPL-MCNC: 4 G/DL (ref 3.5–5.2)
ALBUMIN/GLOB SERPL: 1.5 {RATIO} (ref 1–2.5)
ALP SERPL-CCNC: 69 U/L (ref 40–129)
ALT SERPL-CCNC: 31 U/L (ref 10–50)
ANION GAP SERPL CALCULATED.3IONS-SCNC: 10 MMOL/L (ref 9–16)
AST SERPL-CCNC: 15 U/L (ref 10–50)
BASOPHILS # BLD: 0.1 K/UL (ref 0–0.2)
BASOPHILS NFR BLD: 1 % (ref 0–2)
BILIRUB SERPL-MCNC: 0.7 MG/DL (ref 0–1.2)
BUN SERPL-MCNC: 20 MG/DL (ref 6–20)
CALCIUM SERPL-MCNC: 8.9 MG/DL (ref 8.6–10.4)
CHLORIDE SERPL-SCNC: 105 MMOL/L (ref 98–107)
CO2 SERPL-SCNC: 25 MMOL/L (ref 20–31)
CREAT SERPL-MCNC: 1.1 MG/DL (ref 0.7–1.2)
EOSINOPHIL # BLD: 0.2 K/UL (ref 0–0.4)
EOSINOPHILS RELATIVE PERCENT: 3 % (ref 1–4)
ERYTHROCYTE [DISTWIDTH] IN BLOOD BY AUTOMATED COUNT: 14.7 % (ref 12.5–15.4)
GFR, ESTIMATED: 83 ML/MIN/1.73M2
GLUCOSE SERPL-MCNC: 100 MG/DL (ref 74–99)
HCT VFR BLD AUTO: 47.4 % (ref 41–53)
HGB BLD-MCNC: 16 G/DL (ref 13.5–17.5)
LYMPHOCYTES NFR BLD: 1.5 K/UL (ref 1–4.8)
LYMPHOCYTES RELATIVE PERCENT: 22 % (ref 24–44)
MCH RBC QN AUTO: 29.3 PG (ref 26–34)
MCHC RBC AUTO-ENTMCNC: 33.7 G/DL (ref 31–37)
MCV RBC AUTO: 86.8 FL (ref 80–100)
MONOCYTES NFR BLD: 0.6 K/UL (ref 0.1–1.2)
MONOCYTES NFR BLD: 8 % (ref 2–11)
NEUTROPHILS NFR BLD: 66 % (ref 36–66)
NEUTS SEG NFR BLD: 4.6 K/UL (ref 1.8–7.7)
PLATELET # BLD AUTO: 191 K/UL (ref 140–450)
PMV BLD AUTO: 8.3 FL (ref 6–12)
POTASSIUM SERPL-SCNC: 3.9 MMOL/L (ref 3.7–5.3)
PROT SERPL-MCNC: 6.7 G/DL (ref 6.6–8.7)
RBC # BLD AUTO: 5.46 M/UL (ref 4.5–5.9)
SODIUM SERPL-SCNC: 140 MMOL/L (ref 136–145)
WBC OTHER # BLD: 7 K/UL (ref 3.5–11)

## 2025-07-29 PROCEDURE — 80053 COMPREHEN METABOLIC PANEL: CPT

## 2025-07-29 PROCEDURE — 6360000002 HC RX W HCPCS: Performed by: STUDENT IN AN ORGANIZED HEALTH CARE EDUCATION/TRAINING PROGRAM

## 2025-07-29 PROCEDURE — 6360000002 HC RX W HCPCS: Performed by: NURSE PRACTITIONER

## 2025-07-29 PROCEDURE — 6360000004 HC RX CONTRAST MEDICATION

## 2025-07-29 PROCEDURE — 1200000000 HC SEMI PRIVATE

## 2025-07-29 PROCEDURE — 6370000000 HC RX 637 (ALT 250 FOR IP): Performed by: STUDENT IN AN ORGANIZED HEALTH CARE EDUCATION/TRAINING PROGRAM

## 2025-07-29 PROCEDURE — 36415 COLL VENOUS BLD VENIPUNCTURE: CPT

## 2025-07-29 PROCEDURE — 0D9670Z DRAINAGE OF STOMACH WITH DRAINAGE DEVICE, VIA NATURAL OR ARTIFICIAL OPENING: ICD-10-PCS | Performed by: SURGERY

## 2025-07-29 PROCEDURE — 99232 SBSQ HOSP IP/OBS MODERATE 35: CPT

## 2025-07-29 PROCEDURE — 99232 SBSQ HOSP IP/OBS MODERATE 35: CPT | Performed by: HOSPITALIST

## 2025-07-29 PROCEDURE — 2500000003 HC RX 250 WO HCPCS: Performed by: STUDENT IN AN ORGANIZED HEALTH CARE EDUCATION/TRAINING PROGRAM

## 2025-07-29 PROCEDURE — 74250 X-RAY XM SM INT 1CNTRST STD: CPT

## 2025-07-29 PROCEDURE — 85025 COMPLETE CBC W/AUTO DIFF WBC: CPT

## 2025-07-29 RX ORDER — KETOROLAC TROMETHAMINE 15 MG/ML
30 INJECTION, SOLUTION INTRAMUSCULAR; INTRAVENOUS
Status: COMPLETED | OUTPATIENT
Start: 2025-07-29 | End: 2025-07-29

## 2025-07-29 RX ORDER — LORAZEPAM 2 MG/ML
0.5 INJECTION INTRAMUSCULAR ONCE
Status: COMPLETED | OUTPATIENT
Start: 2025-07-29 | End: 2025-07-29

## 2025-07-29 RX ORDER — DIATRIZOATE MEGLUMINE AND DIATRIZOATE SODIUM 660; 100 MG/ML; MG/ML
300 SOLUTION ORAL; RECTAL
Status: DISCONTINUED | OUTPATIENT
Start: 2025-07-29 | End: 2025-07-30 | Stop reason: HOSPADM

## 2025-07-29 RX ADMIN — DIATRIZOATE MEGLUMINE AND DIATRIZOATE SODIUM 300 ML: 660; 100 LIQUID ORAL; RECTAL at 09:25

## 2025-07-29 RX ADMIN — ACETAMINOPHEN 650 MG: 325 TABLET ORAL at 21:58

## 2025-07-29 RX ADMIN — MORPHINE SULFATE 2 MG: 2 INJECTION, SOLUTION INTRAMUSCULAR; INTRAVENOUS at 10:50

## 2025-07-29 RX ADMIN — CETIRIZINE HYDROCHLORIDE 5 MG: 10 TABLET, FILM COATED ORAL at 12:01

## 2025-07-29 RX ADMIN — SERTRALINE HYDROCHLORIDE 50 MG: 50 TABLET ORAL at 12:03

## 2025-07-29 RX ADMIN — SODIUM CHLORIDE, PRESERVATIVE FREE 10 ML: 5 INJECTION INTRAVENOUS at 21:59

## 2025-07-29 RX ADMIN — Medication 0.5 MG: at 22:32

## 2025-07-29 RX ADMIN — PANTOPRAZOLE SODIUM 40 MG: 40 TABLET, DELAYED RELEASE ORAL at 12:04

## 2025-07-29 RX ADMIN — GABAPENTIN 600 MG: 600 TABLET ORAL at 21:58

## 2025-07-29 RX ADMIN — KETOROLAC TROMETHAMINE 30 MG: 15 INJECTION, SOLUTION INTRAMUSCULAR; INTRAVENOUS at 00:38

## 2025-07-29 RX ADMIN — LISINOPRIL 7.5 MG: 2.5 TABLET ORAL at 12:01

## 2025-07-29 ASSESSMENT — PAIN SCALES - GENERAL
PAINLEVEL_OUTOF10: 2
PAINLEVEL_OUTOF10: 1
PAINLEVEL_OUTOF10: 4

## 2025-07-29 ASSESSMENT — PAIN - FUNCTIONAL ASSESSMENT: PAIN_FUNCTIONAL_ASSESSMENT: PREVENTS OR INTERFERES SOME ACTIVE ACTIVITIES AND ADLS

## 2025-07-29 ASSESSMENT — PAIN DESCRIPTION - DESCRIPTORS
DESCRIPTORS: ACHING
DESCRIPTORS: ACHING;PRESSURE

## 2025-07-29 ASSESSMENT — PAIN DESCRIPTION - LOCATION: LOCATION: ABDOMEN

## 2025-07-29 ASSESSMENT — PAIN DESCRIPTION - ORIENTATION: ORIENTATION: ANTERIOR;MID;UPPER

## 2025-07-29 NOTE — PLAN OF CARE
Problem: Safety - Adult  Goal: Free from fall injury  7/29/2025 1354 by Jamee Bonner LPN  Outcome: Progressing  7/29/2025 0048 by Shahida Ko LPN  Outcome: Progressing     Problem: Discharge Planning  Goal: Discharge to home or other facility with appropriate resources  7/29/2025 1354 by Jamee Bonner LPN  Outcome: Progressing  Flowsheets (Taken 7/29/2025 0730)  Discharge to home or other facility with appropriate resources:   Arrange for needed discharge resources and transportation as appropriate   Identify discharge learning needs (meds, wound care, etc)   Refer to discharge planning if patient needs post-hospital services based on physician order or complex needs related to functional status, cognitive ability or social support system  7/29/2025 0048 by Shahida Ko LPN  Outcome: Progressing     Problem: Pain  Goal: Verbalizes/displays adequate comfort level or baseline comfort level  7/29/2025 1354 by Jamee Bonner LPN  Outcome: Progressing  Flowsheets (Taken 7/29/2025 0730)  Verbalizes/displays adequate comfort level or baseline comfort level:   Encourage patient to monitor pain and request assistance   Assess pain using appropriate pain scale   Administer analgesics based on type and severity of pain and evaluate response   Implement non-pharmacological measures as appropriate and evaluate response  7/29/2025 0048 by Shahida Ko LPN  Outcome: Progressing

## 2025-07-29 NOTE — PROGRESS NOTES
Physical Therapy        Physical Therapy Cancel Note      DATE: 2025    NAME: Jeremy Maldonado  MRN: [id number]   : 1975      Patient not seen this date for Physical Therapy due to:      Per OT, pt up independent in room and bathroom. Pt denies concerns and is at baseline functional level with no acute PT needs. D/C PT      Electronically signed by MADISNO KHAN PT on 2025 at 11:40 AM

## 2025-07-29 NOTE — PROGRESS NOTES
07/29/25 1130   Encounter Summary   Service Provided For Patient   Last Encounter  07/29/25   Spiritual/Emotional needs   Type   (volunteer)

## 2025-07-29 NOTE — PROGRESS NOTES
[facility]  Office: [phone]  Tima Yoon, DO, Terrence Saavedra, DO, Shayan Cope DO, Ceasar Banks, DO, Rani Weinberg MD, Lyn Alva MD, Sharron Owens MD, Gabbi Goetz MD,  Raymundo Rolon MD, Willy Osborne MD, Samantha Lopes MD,  Peace Pool DO, Hero Yoon DO, Madisyn Souza MD,  James Gutierrez DO, Evangelina Calvillo MD, Erika Cohen MD, Prabha Walker MD,  Samir Giordano MD, Leslie Mayfield MD, Patric Zamora MD, Hallie Parra MD, Gage Ellsworth MD, Maurisio Bishop MD, Giovani Arroyo DO, Omaira Aguiar MD, Nicole Araya DO, Akira Mccloud MD, Eddie Moreno MD, Peace Stanley MD, Mohsin Reza, MD, Glendy Stafford MD, Shirley Waterhouse, CNP,  Nolvia Aguillon, CNP, Giovani Jerry, CNP,  Abril Nieto, DNP, Marivel Sultana, CNP, Angelic Iyer, CNP, Waleska Morejon, CNP, Rosemarie August, CNP, Lora Ha, PA-C, Carolee Trejo, CNP, Dennys Ruiz, CNP,  Tova Santoyo, CNP, Noemi Barboza, CNP, Arun Begum, PA-C, Odalys Trotter, PA-C, Rima Peterson, CNP,        Allyson Coronel, CNS, Layla Boyer, CNP, Zayra Martinez, CNP         [facility]   IN-PATIENT SERVICE   [facility]    Progress Note    7/29/2025    8:13 AM    Name:   Jeremy Maldonado  MRN:     [id number]     Acct:      [id number]   Room:   316/316-01  IP Day:  1  Admit Date:  7/28/2025  1:29 PM    PCP:   Cornelius Lorenzana MD  Code Status:  Full Code    Subjective:     49 year old male with past medical history of anxiety, hypertension and restless legs syndrome who presented to the emergency department with diffuse abdominal pain and 2 episodes of emesis is admitted to the hospital for the management of small bowel obstruction. Patient does have a history of cholecystectomy in April 2024.     Patient seen and examined in room, family at bedside, patient is eager to have NG tube removed.  Patient states he has had diarrhea multiple times following small bowel follow-through today.  Patient states he was

## 2025-07-29 NOTE — ED PROVIDER NOTES
[address]  [address] [facility] [address]  Phone: [phone]      Attending Physician Attestation    I personally made/approves the management plan for this patient's and take responsibility for the patient management.    49-year-old male who presents to the emergency department due to vomiting, abdominal pain.  He states that started last night, worsens with deep breath.  Concern for obstruction.  Labs ordered, mildly elevated white count of 12.1, patient is dry with a hemoglobin of 18.9.  CT concerning for obstruction, transition point in the left lower quadrant.  NG placed.  Discussed with Dr. Finch as well as [facility] for admission.    ED Course as of 07/28/25 2247   Mon Jul 28, 2025   1527 EKG: Sinus rhythm, leftward axis, heart rate 88, CA interval 156, QTc 428, no significant ST elevation or depression, unremarkable T waves. [SS]   1746 Metabolic studies show preserved electrolyte, renal and hepatic function no lactic acidosis patient has a mild leukocytosis of 12.1 which is likely reactive secondary to recent episodes of vomiting as well as hemoconcentration with an elevated hemoglobin and hematocrit.  Patient's pain and nausea have been fairly well relieved after IV fluids, antiemetics and analgesics.    CT imaging results still pending, preliminary review with attending physician Dr. Tam is concerning for small bowel obstruction.     CRC called to expedite read and general surgery is consulted for evaluation [AH]   1756 CT imaging has resulted and shows small bowel obstruction with transition point in the left lower quadrant of the abdomen.  No signs of free air within the chest abdomen or pelvis, will insert NG tube and plan for admission. [AH]   1807 Case discussed with the on-call hospitalist Odalys ALMONTE who is agreeable for admission, updated on plan of care with NG tube and surgery evaluation []      ED Course User Index  [AH] Vernon Cabral, APRN - CNP  [SS]

## 2025-07-29 NOTE — PROGRESS NOTES
General Surgery:  Daily Progress Note            PATIENT NAME: Jeremy Maldonado     TODAY'S DATE: 7/29/2025, 6:51 AM  CC: Abdominal pain    SUBJECTIVE:     Patient seen and examined at bedside this morning.  No acute events overnight.  Patient is nondistressed.  Patient is afebrile, hemodynamically stable, on room air.  NGT is secured at nare on intermittent suction.  Approximately 700 cc of green fluid observed in canister.  Patient does endorse flatus.  Denies bowel movements or nausea.  Patient is ambulating.    OBJECTIVE:   VITALS:  /69   Pulse 81   Temp 98.8 °F (37.1 °C) (Oral)   Resp 16   Ht 1.956 m (6' 5\")   Wt 108 kg (238 lb 1.6 oz)   SpO2 93%   BMI 28.23 kg/m²      INTAKE/OUTPUT:      Intake/Output Summary (Last 24 hours) at 7/29/2025 0651  Last data filed at 7/29/2025 0627  Gross per 24 hour   Intake 1000 ml   Output 700 ml   Net 300 ml       PHYSICAL EXAM:  General Appearance: Lying comfortably in bed.  No acute distress.  HEENT: Atraumatic.  Normocephalic.  NGT secured at nare on intermittent suction.  Heart: RRR.  Nontachycardic.  Lungs: Normal work of breathing.  On room air.  Abdomen: Soft, nondistended, nontender.  Nonperitonitic.  Extremities: Moving all extremities.  Skin: Well-perfused.  Psych: Appropriate.    Radiology Review:   XR ABDOMEN (KUB) (SINGLE AP VIEW)  Result Date: 7/28/2025  Interval enteric tube placement with tip in the gastric body region. Persistent small bowel obstruction.     CT CHEST ABDOMEN PELVIS W CONTRAST Additional Contrast? None  Result Date: 7/28/2025  1. Small bowel obstruction with possible transition point in the left lower quadrant of the abdomen. No obvious mass lesions are seen. 2. No acute cardiopulmonary process. 3. Scattered left-sided colonic diverticula.     ASSESSMENT:  Active Hospital Problems    Diagnosis Date Noted    Small bowel obstruction (HCC) [K56.609] 07/28/2025       49 y.o. male who presented to the ED with abdominal pain with CT

## 2025-07-29 NOTE — PROGRESS NOTES
Occupational Therapy    [facility]  Occupational Therapy Not Seen Note    DATE: 2025    NAME: Jeremy Maldonado  MRN: [id number]   : 1975      Patient not seen this date for Occupational Therapy due to:    Screen this date with Pt up in bathroom IND upon room entry. Patient at baseline functional level with no acute OT needs. Will defer OT evaluation at this time. Please reorder OT if future needs arise.     Electronically signed by EVETTE AGUILAR on 2025 at 11:36 AM

## 2025-07-30 VITALS
HEART RATE: 71 BPM | BODY MASS INDEX: 28.63 KG/M2 | DIASTOLIC BLOOD PRESSURE: 77 MMHG | HEIGHT: 77 IN | WEIGHT: 242.51 LBS | OXYGEN SATURATION: 96 % | RESPIRATION RATE: 16 BRPM | SYSTOLIC BLOOD PRESSURE: 113 MMHG | TEMPERATURE: 97.9 F

## 2025-07-30 PROCEDURE — 99238 HOSP IP/OBS DSCHRG MGMT 30/<: CPT | Performed by: HOSPITALIST

## 2025-07-30 PROCEDURE — 99232 SBSQ HOSP IP/OBS MODERATE 35: CPT

## 2025-07-30 PROCEDURE — 6370000000 HC RX 637 (ALT 250 FOR IP): Performed by: STUDENT IN AN ORGANIZED HEALTH CARE EDUCATION/TRAINING PROGRAM

## 2025-07-30 RX ADMIN — LISINOPRIL 7.5 MG: 2.5 TABLET ORAL at 09:15

## 2025-07-30 RX ADMIN — CETIRIZINE HYDROCHLORIDE 5 MG: 10 TABLET, FILM COATED ORAL at 09:14

## 2025-07-30 RX ADMIN — PANTOPRAZOLE SODIUM 40 MG: 40 TABLET, DELAYED RELEASE ORAL at 05:53

## 2025-07-30 RX ADMIN — GABAPENTIN 600 MG: 600 TABLET ORAL at 09:13

## 2025-07-30 RX ADMIN — SERTRALINE HYDROCHLORIDE 50 MG: 50 TABLET ORAL at 09:15

## 2025-07-30 ASSESSMENT — PAIN SCALES - GENERAL: PAINLEVEL_OUTOF10: 0

## 2025-07-30 NOTE — PROGRESS NOTES
[facility]  Office: [phone]  Tima Yoon, DO, Terrence Saavedra, DO, Shayan Cope DO, Ceasar Banks, DO, Rani Weinberg MD, Lyn Alva MD, Sharron Owens MD, Gabbi Goetz MD,  Raymundo Rolon MD, Willy Osborne MD, Samantha Lopes MD,  Peace Pool DO, Hero Yoon DO, Madisyn Souza MD,  James Gutierrez DO, Evangelina Calvillo MD, Erika Cohen MD, Prabha Walker MD,  Samir Giordano MD, Leslie Mayfield MD, Patric Zamora MD, Hallie Parra MD, Gage Ellsworth MD, Maurisio Bishop MD, Giovani Arroyo, DO, Omaira Aguiar MD, Nicole Araya DO, Akira Mccloud MD, Eddie Moreno MD, Peace Stanley MD, Mohsin Reza, MD, Glendy Stafford MD, Shirley Waterhouse, CNP,  Nolvia Aguillon, CNP, Giovani Jerry, CNP,  Abril Nieto, DNP, Marivel Sultana, CNP, Angelic Iyer, CNP, Waleska Morejon, CNP, Rosemarie August, CNP, Lora Ha, PA-C, Carolee Trejo, CNP, Dennys Ruiz, CNP,  Tova Santoyo, CNP, Noemi Barboza, CNP, Arun Begum, PA-C, Odalys Trotter, PA-C, Rima Peterson, CNP,        Allyson Coronel, CNS, Layla Boyer, CNP, Zayra Martinez, CNP         [facility]   IN-PATIENT SERVICE   [facility]    Progress Note    7/30/2025    7:32 AM    Name:   Jeremy Maldonado  MRN:     [id number]     Acct:      [id number]   Room:   316/316-01   Day:  2  Admit Date:  7/28/2025  1:29 PM    PCP:   Cornelius Lorenzana MD  Code Status:  Full Code    Subjective:     49 year old male with past medical history of anxiety, hypertension and restless legs syndrome who presented to the emergency department with diffuse abdominal pain and 2 episodes of emesis is admitted to the hospital for the management of small bowel obstruction. Patient does have a history of cholecystectomy in April 2024.     Patient seen and examined at bedside, patient denies any concerns at this time, patient is eager for discharge. Patient has been tolerating liquid diet and is ready to advance diet to solids. Patient states he did eat

## 2025-07-30 NOTE — FLOWSHEET NOTE
07/30/25 1124   AVS Reviewed   AVS & discharge instructions reviewed with patient and/or representative? Yes   Reviewed instructions with Patient   Level of Understanding Questions answered;Teach back completed;Verbalized understanding     Pt discharged home. Iv removed. All discharge instructions reviewed with patient at bedside. Pt states they understand all instructions.. no further questions.

## 2025-07-30 NOTE — PLAN OF CARE
Problem: Safety - Adult  Goal: Free from fall injury  7/30/2025 1128 by Brannon Galvan LPN  Outcome: Progressing  7/30/2025 0328 by Osiel Puckett LPN  Outcome: Progressing     Problem: Discharge Planning  Goal: Discharge to home or other facility with appropriate resources  7/30/2025 1128 by Brannon Galvan LPN  Outcome: Progressing  7/30/2025 0328 by Osiel Puckett LPN  Outcome: Progressing     Problem: Pain  Goal: Verbalizes/displays adequate comfort level or baseline comfort level  7/30/2025 1128 by Brannon Galvan LPN  Outcome: Progressing  7/30/2025 0328 by Osiel Puckett LPN  Outcome: Progressing

## 2025-07-30 NOTE — PLAN OF CARE
Problem: Safety - Adult  Goal: Free from fall injury  7/30/2025 0328 by Osiel Puckett LPN  Outcome: Progressing     Problem: Discharge Planning  Goal: Discharge to home or other facility with appropriate resources  7/30/2025 0328 by Osiel Puckett LPN  Outcome: Progressing     Problem: Pain  Goal: Verbalizes/displays adequate comfort level or baseline comfort level  7/30/2025 0328 by Osiel Puckett LPN  Outcome: Progressing

## 2025-07-30 NOTE — DISCHARGE SUMMARY
ileum. 2. Bibasilar atelectasis, right greater than left. 3. NG tube with distal tip overlying the left upper quadrant likely within the body/fundus of the stomach.     XR ABDOMEN (KUB) (SINGLE AP VIEW)  Result Date: 7/28/2025  Interval enteric tube placement with tip in the gastric body region. Persistent small bowel obstruction.     CT CHEST ABDOMEN PELVIS W CONTRAST Additional Contrast? None  Result Date: 7/28/2025  1. Small bowel obstruction with possible transition point in the left lower quadrant of the abdomen. No obvious mass lesions are seen. 2. No acute cardiopulmonary process. 3. Scattered left-sided colonic diverticula.       Consultations:    Consults:     Final Specialist Recommendations/Findings:   IP CONSULT TO HOSPITALIST  IP CONSULT TO GENERAL SURGERY      The patient was seen and examined on day of discharge and this discharge summary is in conjunction with any daily progress note from day of discharge.    Discharge plan:     Disposition: Home    Physician Follow Up:   No follow-up provider specified.     Requiring Further Evaluation/Follow Up POST HOSPITALIZATION/Incidental Findings: none    Diet: regular diet    Activity: As tolerated    Instructions to Patient:     Discharge Medications:      Medication List        ASK your doctor about these medications      fluticasone 50 MCG/ACT nasal spray  Commonly known as: FLONASE  2 sprays by Each Nostril route daily     gabapentin 600 MG tablet  Commonly known as: NEURONTIN  TAKE 1 TABLET BY MOUTH 2 TIMES A DAY FOR 90 DAYS     levocetirizine 5 MG tablet  Commonly known as: XYZAL  Take 1 tablet by mouth nightly     lisinopril 5 MG tablet  Commonly known as: PRINIVIL;ZESTRIL  take 1 AND 1/2 tablets by mouth once daily     multivitamin Tabs tablet     omeprazole 20 MG delayed release capsule  Commonly known as: PRILOSEC  Take 1 capsule by mouth 2 times daily     rOPINIRole 4 MG extended release tablet  Commonly known as: REQUIP XL  Take 1 tablet by mouth